# Patient Record
Sex: FEMALE | Race: WHITE | Employment: FULL TIME | URBAN - METROPOLITAN AREA
[De-identification: names, ages, dates, MRNs, and addresses within clinical notes are randomized per-mention and may not be internally consistent; named-entity substitution may affect disease eponyms.]

---

## 2019-09-17 ENCOUNTER — OFFICE VISIT (OUTPATIENT)
Dept: OBGYN | Age: 44
End: 2019-09-17
Payer: COMMERCIAL

## 2019-09-17 ENCOUNTER — HOSPITAL ENCOUNTER (OUTPATIENT)
Age: 44
Setting detail: SPECIMEN
Discharge: HOME | End: 2019-09-17
Payer: COMMERCIAL

## 2019-09-17 VITALS
WEIGHT: 146 LBS | HEIGHT: 64 IN | BODY MASS INDEX: 24.92 KG/M2 | SYSTOLIC BLOOD PRESSURE: 118 MMHG | DIASTOLIC BLOOD PRESSURE: 74 MMHG

## 2019-09-17 DIAGNOSIS — Z01.419 WELL WOMAN EXAM WITH ROUTINE GYNECOLOGICAL EXAM: Primary | ICD-10-CM

## 2019-09-17 DIAGNOSIS — Z11.51 SCREENING FOR HPV (HUMAN PAPILLOMAVIRUS): ICD-10-CM

## 2019-09-17 DIAGNOSIS — Z01.419 WELL WOMAN EXAM WITH ROUTINE GYNECOLOGICAL EXAM: ICD-10-CM

## 2019-09-17 DIAGNOSIS — Z12.39 SCREENING FOR BREAST CANCER: ICD-10-CM

## 2019-09-17 PROCEDURE — G0145 SCR C/V CYTO,THINLAYER,RESCR: HCPCS

## 2019-09-17 PROCEDURE — 87624 HPV HI-RISK TYP POOLED RSLT: CPT

## 2019-09-17 PROCEDURE — 99386 PREV VISIT NEW AGE 40-64: CPT | Performed by: ADVANCED PRACTICE MIDWIFE

## 2019-09-17 ASSESSMENT — PATIENT HEALTH QUESTIONNAIRE - PHQ9: DEPRESSION UNABLE TO ASSESS: PT REFUSES

## 2019-09-17 NOTE — PROGRESS NOTES
YEARLY PHYSICAL    Date of service: 2019    Heather Jack  Is a 40 y.o.   female    PT's PCP is: No primary care provider on file. : 1975                                             Subjective:       Patient's last menstrual period was 2019 (exact date). Are your menses regular: yes    OB History    Para Term  AB Living   0 0 0 0 0 0   SAB TAB Ectopic Molar Multiple Live Births   0 0 0 0 0 0        Social History     Tobacco Use   Smoking Status Never Smoker   Smokeless Tobacco Never Used        Social History     Substance and Sexual Activity   Alcohol Use Yes    Comment: social       History reviewed. No pertinent family history. Any family history of breast or ovarian cancer: No     Any family history of blood clots: No      Allergies: Patient has no known allergies. No current outpatient medications on file. Social History     Substance and Sexual Activity   Sexual Activity Yes    Partners: Male    Comment:  had a vasecotmy        Any bleeding or pain with intercourse: No    Last Yearly:  2016    Last pap: 2016    Last HPV: never    Last Mammogram: 2018    Last Dexascan n/a    Last colorectal screen- type:n/a*  date      Do you do self breast exams: No    History reviewed. No pertinent past medical history. Past Surgical History:   Procedure Laterality Date    FOOT SURGERY Bilateral        History reviewed. No pertinent family history. Chief Complaint   Patient presents with    Gynecologic Exam     New Patient. Yearly-PAP. Pt states her last pap was done in [facility] in . pt states no issues or concerns. pt states she has never had an abnormal pap           PE:  Vital Signs  Blood pressure 118/74, height 5' 4\" (1.626 m), weight 146 lb (66.2 kg), last menstrual period 2019. Labs:    No results found for this visit on 19.       NURSE: ORLY    HPI: pt here today

## 2019-09-19 LAB
HPV SAMPLE: NORMAL
HPV, GENOTYPE 16: NOT DETECTED
HPV, GENOTYPE 18: NOT DETECTED
HPV, HIGH RISK OTHER: NOT DETECTED
HPV, INTERPRETATION: NORMAL
SPECIMEN DESCRIPTION: NORMAL

## 2019-09-25 LAB — CYTOLOGY REPORT: NORMAL

## 2021-03-02 ENCOUNTER — OFFICE VISIT (OUTPATIENT)
Dept: FAMILY MEDICINE CLINIC | Age: 46
End: 2021-03-02
Payer: COMMERCIAL

## 2021-03-02 ENCOUNTER — HOSPITAL ENCOUNTER (OUTPATIENT)
Age: 46
Discharge: HOME | End: 2021-03-02
Payer: COMMERCIAL

## 2021-03-02 VITALS
HEIGHT: 64 IN | WEIGHT: 148 LBS | HEART RATE: 76 BPM | BODY MASS INDEX: 25.27 KG/M2 | SYSTOLIC BLOOD PRESSURE: 118 MMHG | DIASTOLIC BLOOD PRESSURE: 80 MMHG | OXYGEN SATURATION: 99 %

## 2021-03-02 DIAGNOSIS — Z00.00 WELLNESS EXAMINATION: ICD-10-CM

## 2021-03-02 DIAGNOSIS — Z12.31 SCREENING MAMMOGRAM, ENCOUNTER FOR: ICD-10-CM

## 2021-03-02 DIAGNOSIS — Z13.220 LIPID SCREENING: ICD-10-CM

## 2021-03-02 DIAGNOSIS — R10.13 EPIGASTRIC PAIN: ICD-10-CM

## 2021-03-02 DIAGNOSIS — Z00.00 WELLNESS EXAMINATION: Primary | ICD-10-CM

## 2021-03-02 DIAGNOSIS — Z13.1 DIABETES MELLITUS SCREENING: ICD-10-CM

## 2021-03-02 PROBLEM — R92.8 ABNORMAL MAMMOGRAM: Status: ACTIVE | Noted: 2018-06-25

## 2021-03-02 PROBLEM — Z01.419 WELL WOMAN EXAM WITH ROUTINE GYNECOLOGICAL EXAM: Status: ACTIVE | Noted: 2018-06-18

## 2021-03-02 PROBLEM — R92.2 DENSE BREAST TISSUE: Status: ACTIVE | Noted: 2018-06-25

## 2021-03-02 PROBLEM — R92.30 DENSE BREAST TISSUE: Status: ACTIVE | Noted: 2018-06-25

## 2021-03-02 LAB
ALBUMIN SERPL-MCNC: 4.6 G/DL (ref 3.5–5.2)
ALBUMIN/GLOBULIN RATIO: 1.5 (ref 1–2.5)
ALP BLD-CCNC: 50 U/L (ref 35–104)
ALT SERPL-CCNC: 8 U/L (ref 5–33)
AMYLASE: 24 U/L (ref 28–100)
ANION GAP SERPL CALCULATED.3IONS-SCNC: 7 MMOL/L (ref 9–17)
AST SERPL-CCNC: 13 U/L
BILIRUB SERPL-MCNC: 0.52 MG/DL (ref 0.3–1.2)
BUN BLDV-MCNC: 12 MG/DL (ref 6–20)
BUN/CREAT BLD: 15 (ref 9–20)
CALCIUM SERPL-MCNC: 9.5 MG/DL (ref 8.6–10.4)
CHLORIDE BLD-SCNC: 98 MMOL/L (ref 98–107)
CHOLESTEROL/HDL RATIO: 2.1
CHOLESTEROL: 192 MG/DL
CO2: 28 MMOL/L (ref 20–31)
CREAT SERPL-MCNC: 0.78 MG/DL (ref 0.5–0.9)
ESTIMATED AVERAGE GLUCOSE: 97 MG/DL
GFR AFRICAN AMERICAN: >60 ML/MIN
GFR NON-AFRICAN AMERICAN: >60 ML/MIN
GFR SERPL CREATININE-BSD FRML MDRD: ABNORMAL ML/MIN/{1.73_M2}
GFR SERPL CREATININE-BSD FRML MDRD: ABNORMAL ML/MIN/{1.73_M2}
GLUCOSE BLD-MCNC: 91 MG/DL (ref 70–99)
HBA1C MFR BLD: 5 % (ref 4–6)
HCT VFR BLD CALC: 43.9 % (ref 36.3–47.1)
HDLC SERPL-MCNC: 90 MG/DL
HEMOGLOBIN: 14.4 G/DL (ref 11.9–15.1)
LDL CHOLESTEROL: 87 MG/DL (ref 0–130)
LIPASE: 19 U/L (ref 13–60)
MCH RBC QN AUTO: 31.8 PG (ref 25.2–33.5)
MCHC RBC AUTO-ENTMCNC: 32.8 G/DL (ref 28.4–34.8)
MCV RBC AUTO: 96.9 FL (ref 82.6–102.9)
NRBC AUTOMATED: 0 PER 100 WBC
PDW BLD-RTO: 11.8 % (ref 11.8–14.4)
PLATELET # BLD: 224 K/UL (ref 138–453)
PMV BLD AUTO: 10.7 FL (ref 8.1–13.5)
POTASSIUM SERPL-SCNC: 3.8 MMOL/L (ref 3.7–5.3)
RBC # BLD: 4.53 M/UL (ref 3.95–5.11)
SODIUM BLD-SCNC: 133 MMOL/L (ref 135–144)
TOTAL PROTEIN: 7.6 G/DL (ref 6.4–8.3)
TRIGL SERPL-MCNC: 73 MG/DL
VLDLC SERPL CALC-MCNC: NORMAL MG/DL (ref 1–30)
WBC # BLD: 5.7 K/UL (ref 3.5–11.3)

## 2021-03-02 PROCEDURE — 99386 PREV VISIT NEW AGE 40-64: CPT | Performed by: NURSE PRACTITIONER

## 2021-03-02 PROCEDURE — 83036 HEMOGLOBIN GLYCOSYLATED A1C: CPT

## 2021-03-02 PROCEDURE — 93005 ELECTROCARDIOGRAM TRACING: CPT | Performed by: NURSE PRACTITIONER

## 2021-03-02 PROCEDURE — 36415 COLL VENOUS BLD VENIPUNCTURE: CPT

## 2021-03-02 PROCEDURE — 83690 ASSAY OF LIPASE: CPT

## 2021-03-02 PROCEDURE — 85027 COMPLETE CBC AUTOMATED: CPT

## 2021-03-02 PROCEDURE — 80061 LIPID PANEL: CPT

## 2021-03-02 PROCEDURE — 80053 COMPREHEN METABOLIC PANEL: CPT

## 2021-03-02 PROCEDURE — 82150 ASSAY OF AMYLASE: CPT

## 2021-03-02 PROCEDURE — 93010 ELECTROCARDIOGRAM REPORT: CPT | Performed by: NURSE PRACTITIONER

## 2021-03-02 RX ORDER — FAMOTIDINE 20 MG/1
20 TABLET, FILM COATED ORAL 2 TIMES DAILY
Qty: 60 TABLET | Refills: 3 | Status: SHIPPED | OUTPATIENT
Start: 2021-03-02

## 2021-03-02 SDOH — ECONOMIC STABILITY: FOOD INSECURITY: WITHIN THE PAST 12 MONTHS, THE FOOD YOU BOUGHT JUST DIDN'T LAST AND YOU DIDN'T HAVE MONEY TO GET MORE.: NEVER TRUE

## 2021-03-02 SDOH — ECONOMIC STABILITY: FOOD INSECURITY: WITHIN THE PAST 12 MONTHS, YOU WORRIED THAT YOUR FOOD WOULD RUN OUT BEFORE YOU GOT MONEY TO BUY MORE.: NEVER TRUE

## 2021-03-02 ASSESSMENT — ENCOUNTER SYMPTOMS
ABDOMINAL PAIN: 1
RHINORRHEA: 0
CONSTIPATION: 0
NAUSEA: 1
VOMITING: 1
COUGH: 0
SINUS PAIN: 0
WHEEZING: 0
ANAL BLEEDING: 0
DIARRHEA: 0
BLOOD IN STOOL: 0
SORE THROAT: 0
ABDOMINAL DISTENTION: 0
SHORTNESS OF BREATH: 0
SINUS PRESSURE: 0
CHEST TIGHTNESS: 0

## 2021-03-02 ASSESSMENT — PATIENT HEALTH QUESTIONNAIRE - PHQ9
SUM OF ALL RESPONSES TO PHQ QUESTIONS 1-9: 0
2. FEELING DOWN, DEPRESSED OR HOPELESS: 0
SUM OF ALL RESPONSES TO PHQ9 QUESTIONS 1 & 2: 0

## 2021-03-02 NOTE — PATIENT INSTRUCTIONS
SURVEY:    You may be receiving a survey from [employer] regarding your visit today. Please complete the survey to enable us to provide the highest quality of care to you and your family. If you cannot score us a very good on any question, please call the office to discuss how we could of made your experience a very good one. Thank you.

## 2021-03-02 NOTE — PROGRESS NOTES
Name: Kenyetta Cordero  : 1975         Chief Complaint:     Chief Complaint   Patient presents with   [address]     Patient comes in for est care.  Pain     Patient states she is having pain upper sternum. occurs after eating, constant, heavy. Started about a week ago. Heat and rest helps. History of Present Illness:      Kenyetta Cordero is a 39 y.o.  female who presents with Establish Care (Patient comes in for est care. ) and Pain (Patient states she is having pain upper sternum. occurs after eating, constant, heavy. Started about a week ago. Heat and rest helps.)      HPI   The patient presents to establish care. She denies known medical history or current medication use. She admits a chronic history of epigastric pain that would occur once weekly. Starting a week ago, epigastric pain has become more constant and more severe. She states that she is unable to get comfortable. Symptoms now occurring daily. She has used antacids and ibuprofen without relief. She admits nausea with one episode of vomiting 7 days ago. She denies house contacts with similar symptoms. Heating blanket to epigastric area has helped symptoms. Epigastric pain more severe after eating. She denies any triggering foods. She has eliminated coffee and alcohol with no relief of symptoms. She states her diet is not well-balanced and \"not healthy\". She admits eating a heavy diet full of meat and potatoes. She admits normal consistency bowel movement once daily. Denies diarrhea, constipation, current nausea, fever, or chills. She denies reflux symptoms expanding up her esophagus. Denies choking or difficulty swallowing. For exercise, she uses the elliptical for 30 minutes. Past Medical History:     No past medical history on file.    Reviewed all health maintenance requirements and ordered appropriate tests  Health Maintenance Due   Topic Date Due    Lipid screen  Never done    Diabetes screen  Never done Past Surgical History:     Past Surgical History:   Procedure Laterality Date    FOOT SURGERY Bilateral         Medications:       Prior to Admission medications    Medication Sig Start Date End Date Taking? Authorizing Provider   famotidine (PEPCID) 20 MG tablet Take 1 tablet by mouth 2 times daily 3/2/21  Yes GONZALO Ackerman CNP        Allergies:       No known allergies    Social History:     Tobacco:    reports that she has never smoked. She has never used smokeless tobacco.  Alcohol:      reports current alcohol use. Drug Use:  reports no history of drug use. Family History:     No family history on file. Review of Systems:     Positive and Negative as described in HPI    Review of Systems   Constitutional: Negative for chills, fatigue, fever and unexpected weight change. HENT: Negative for congestion, rhinorrhea, sinus pressure, sinus pain and sore throat. Eyes: Negative for visual disturbance. Respiratory: Negative for cough, chest tightness, shortness of breath and wheezing. Cardiovascular: Negative for chest pain and palpitations. Gastrointestinal: Positive for abdominal pain, nausea and vomiting. Negative for abdominal distention, anal bleeding, blood in stool, constipation and diarrhea. Genitourinary: Negative for difficulty urinating and dysuria. Musculoskeletal: Negative for arthralgias, joint swelling and myalgias. Skin: Negative for rash. Neurological: Negative for dizziness, light-headedness and headaches. Physical Exam:   Vitals:  /80   Pulse 76   Ht 5' 4\" (1.626 m)   Wt 148 lb (67.1 kg)   SpO2 99%   BMI 25.40 kg/m²     Physical Exam  Constitutional:       General: She is not in acute distress. Appearance: Normal appearance. She is normal weight. She is not ill-appearing or toxic-appearing. HENT:      Head: Normocephalic. Right Ear: Tympanic membrane, ear canal and external ear normal. There is no impacted cerumen. Left Ear: Tympanic membrane, ear canal and external ear normal. There is no impacted cerumen. Nose: Nose normal. No congestion or rhinorrhea. Mouth/Throat:      Mouth: Mucous membranes are moist.      Pharynx: No oropharyngeal exudate or posterior oropharyngeal erythema. Eyes:      General:         Right eye: No discharge. Left eye: No discharge. Extraocular Movements: Extraocular movements intact. Conjunctiva/sclera: Conjunctivae normal.      Pupils: Pupils are equal, round, and reactive to light. Neck:      Musculoskeletal: Neck supple. Cardiovascular:      Rate and Rhythm: Normal rate and regular rhythm. Heart sounds: Normal heart sounds. No murmur. Pulmonary:      Effort: Pulmonary effort is normal. No respiratory distress. Breath sounds: Normal breath sounds. No stridor. No wheezing, rhonchi or rales. Abdominal:      General: Abdomen is flat. Bowel sounds are normal. There is no distension. Palpations: Abdomen is soft. There is no mass. Tenderness: There is abdominal tenderness (Epigastric area). There is no guarding or rebound. Hernia: No hernia is present. Comments: Negative Renae's sign   Lymphadenopathy:      Cervical: No cervical adenopathy. Skin:     General: Skin is warm. Neurological:      Mental Status: She is alert and oriented to person, place, and time. Psychiatric:         Mood and Affect: Mood normal.         Behavior: Behavior normal.         Thought Content: Thought content normal.         Judgment: Judgment normal.         Data:     No results found for: NA, K, CL, CO2, BUN, CREATININE, GLUCOSE, PROT, LABALBU, BILITOT, ALKPHOS, AST, ALT  No results found for: WBC, RBC, HGB, HCT, MCV, MCH, MCHC, RDW, PLT, MPV  No results found for: TSH  No results found for: CHOL, HDL, PSA, LABA1C    Assessment/Plan:      Diagnosis Orders   1.  Wellness examination  CBC    Hemoglobin A1C    Lipid Panel    Comprehensive Metabolic Panel Lipase    Amylase   2. Epigastric pain  CBC    Comprehensive Metabolic Panel    Lipase    Amylase    EKG 12 lead   3. Lipid screening  Lipid Panel   4. Diabetes mellitus screening  Hemoglobin A1C   5. Screening mammogram, encounter for  RADHA DIGITAL SCREEN W OR WO CAD BILATERAL     Wellness:   Patient following with Audra Favre for [facility]. Most recent Pap 9/2019 WNL. Screening mammogram ordered today  Declines flu vaccination today  Supplied health counseling on well-balanced diet and routine physical exercise  Discussed BMI of 25  Baseline labs ordered today    Epigastric Pain:   EKG in office WNL. Discussed results with patient. Amylase and lipase ordered for completion today  Physical exam not suggestive of gallbladder involvement. However, we will continue to monitor and order ultrasound as needed. Symptoms most suggestive of GERD. Encouraged patient to trial Pepcid 20mg PRN. She may increase this to twice daily if needed.   Discussed lifestyle modifications to manage GERD symptoms including smaller, more frequent meals and avoiding lying down 3 hours after meals  Follow-up 2 weeks for reevaluation or sooner if symptoms worsen or persist.    Completed Refills   Requested Prescriptions     Signed Prescriptions Disp Refills    famotidine (PEPCID) 20 MG tablet 60 tablet 3     Sig: Take 1 tablet by mouth 2 times daily       Orders Placed This Encounter   Procedures    RADHA DIGITAL SCREEN W OR WO CAD BILATERAL     Standing Status:   Future     Standing Expiration Date:   3/2/2022     Order Specific Question:   Reason for exam:     Answer:   screening mammogram    CBC     Standing Status:   Future     Standing Expiration Date:   3/2/2022    Hemoglobin A1C     Standing Status:   Future     Standing Expiration Date:   3/2/2022    Lipid Panel     Standing Status:   Future     Standing Expiration Date:   3/2/2022     Order Specific Question:   Is Patient Fasting?/# of Hours Answer:   fasting    Comprehensive Metabolic Panel     Standing Status:   Future     Standing Expiration Date:   3/2/2022    Lipase     Standing Status:   Future     Standing Expiration Date:   3/2/2022    Amylase     Standing Status:   Future     Standing Expiration Date:   3/2/2022    EKG 12 lead     Order Specific Question:   Reason for Exam?     Answer: Other     Comments:   epigastric pain        No results found for this visit on 03/02/21. Return in about 2 weeks (around 3/16/2021), or if symptoms worsen or fail to improve, for abdominal pain f/u .     Electronically signed by GONZALO Justice CNP on 03/02/21 at 9:38 AM.

## 2021-03-16 ENCOUNTER — OFFICE VISIT (OUTPATIENT)
Dept: FAMILY MEDICINE CLINIC | Age: 46
End: 2021-03-16
Payer: COMMERCIAL

## 2021-03-16 VITALS
OXYGEN SATURATION: 99 % | BODY MASS INDEX: 25.27 KG/M2 | WEIGHT: 148 LBS | DIASTOLIC BLOOD PRESSURE: 80 MMHG | SYSTOLIC BLOOD PRESSURE: 116 MMHG | HEART RATE: 76 BPM | HEIGHT: 64 IN

## 2021-03-16 DIAGNOSIS — K21.9 GASTROESOPHAGEAL REFLUX DISEASE WITHOUT ESOPHAGITIS: Primary | ICD-10-CM

## 2021-03-16 PROCEDURE — 99213 OFFICE O/P EST LOW 20 MIN: CPT | Performed by: NURSE PRACTITIONER

## 2021-03-16 ASSESSMENT — ENCOUNTER SYMPTOMS: ABDOMINAL PAIN: 0

## 2021-03-16 NOTE — PROGRESS NOTES
Name: Gena Aguiar  : 1975         Chief Complaint:     Chief Complaint   Patient presents with    Follow-up     Patient states the first week was rough, but this week has improved alot. History of Present Illness:      Gena Aguiar is a 39 y.o.  female who presents with Follow-up (Patient states the first week was rough, but this week has improved alot. )      HPI    Epigastric Pain:   The patient presents for 2 week follow up of epigastric pain. She was initiated on pepcid 20mg BID two weeks ago. At first, she admits to worsening nausea and vomiting prior to beginning the Pepcid. She began to take the Pepcid once daily as needed but states that this was not successful in relieving her symptoms. There was an episode where she had 12 hours of severe epigastric pain accompanied with vomiting. She then switched to taking Pepcid twice daily and states that her symptoms have improved since then. Triggering foods include red meat and tomato sauce. Since taking the Pepcid, she admits dull, \"tight\" epigastric pain only after eating triggering foods. If she avoids these foods, she does not have any symptoms. She denies hematemesis, difficulty swallowing, or weight loss. She admits remaining upright after meals and having an increasing appetite. Overall, the patient's symptoms have improved. Past Medical History:     No past medical history on file. Reviewed all health maintenance requirements and ordered appropriate tests  There are no preventive care reminders to display for this patient. Past Surgical History:     Past Surgical History:   Procedure Laterality Date    FOOT SURGERY Bilateral         Medications:       Prior to Admission medications    Medication Sig Start Date End Date Taking?  Authorizing Provider   famotidine (PEPCID) 20 MG tablet Take 1 tablet by mouth 2 times daily 3/2/21  Yes GONZALO Auguste CNP        Allergies:       No known allergies    Social History: Tobacco:    reports that she has never smoked. She has never used smokeless tobacco.  Alcohol:      reports current alcohol use. Drug Use:  reports no history of drug use. Family History:     No family history on file. Review of Systems:     Positive and Negative as described in HPI    Review of Systems   Gastrointestinal: Negative for abdominal pain. No dysphagia       Physical Exam:   Vitals:  /80   Pulse 76   Ht 5' 4\" (1.626 m)   Wt 148 lb (67.1 kg)   SpO2 99%   BMI 25.40 kg/m²     Physical Exam  Constitutional:       General: She is not in acute distress. Appearance: Normal appearance. She is normal weight. She is not ill-appearing or toxic-appearing. HENT:      Head: Normocephalic. Cardiovascular:      Rate and Rhythm: Normal rate and regular rhythm. Heart sounds: Normal heart sounds. No murmur. Pulmonary:      Effort: Pulmonary effort is normal. No respiratory distress. Breath sounds: Normal breath sounds. No stridor. No wheezing, rhonchi or rales. Abdominal:      General: Abdomen is flat. Bowel sounds are normal. There is no distension. Palpations: Abdomen is soft. There is no mass. Tenderness: There is no abdominal tenderness. There is no guarding. Hernia: No hernia is present. Comments: No epigastric tenderness with palpation   Skin:     General: Skin is warm. Neurological:      Mental Status: She is alert and oriented to person, place, and time. Psychiatric:         Mood and Affect: Mood normal.         Behavior: Behavior normal.         Thought Content:  Thought content normal.         Judgment: Judgment normal.         Data:     Lab Results   Component Value Date     03/02/2021    K 3.8 03/02/2021    CL 98 03/02/2021    CO2 28 03/02/2021    BUN 12 03/02/2021    CREATININE 0.78 03/02/2021    GLUCOSE 91 03/02/2021    PROT 7.6 03/02/2021    LABALBU 4.6 03/02/2021    BILITOT 0.52 03/02/2021    ALKPHOS 50 03/02/2021    AST 13 03/02/2021    ALT 8 03/02/2021     Lab Results   Component Value Date    WBC 5.7 03/02/2021    RBC 4.53 03/02/2021    HGB 14.4 03/02/2021    HCT 43.9 03/02/2021    MCV 96.9 03/02/2021    MCH 31.8 03/02/2021    MCHC 32.8 03/02/2021    RDW 11.8 03/02/2021     03/02/2021    MPV 10.7 03/02/2021     No results found for: TSH  Lab Results   Component Value Date    CHOL 192 03/02/2021    HDL 90 03/02/2021    LABA1C 5.0 03/02/2021       Assessment/Plan:      Diagnosis Orders   1. Gastroesophageal reflux disease without esophagitis         Symptoms stable on Pepcid twice daily. Continue this regimen. Encouraged continuation of dietary diary in hopes of identifying and eliminating triggering foods. Reviewed GERD lifestyle modifications including small frequent meals and staying upright for 3 hours after meals. Discussed if symptoms worsened or persist, we may trial PPI. However, because she has improved so greatly on Pepcid we will continue this. Follow-up in 4 months or sooner if questions worsen or persist.    Completed Refills   Requested Prescriptions      No prescriptions requested or ordered in this encounter       No orders of the defined types were placed in this encounter. No results found for this visit on 03/16/21. Return in about 4 months (around 7/16/2021), or if symptoms worsen or fail to improve, for GERD f/u.     Electronically signed by GONZALO Reina CNP on 03/16/21 at 1:56 PM.

## 2021-04-01 PROBLEM — Z01.419 WELL WOMAN EXAM WITH ROUTINE GYNECOLOGICAL EXAM: Status: RESOLVED | Noted: 2018-06-18 | Resolved: 2021-04-01

## 2021-06-04 ENCOUNTER — HOSPITAL ENCOUNTER (OUTPATIENT)
Dept: WOMENS IMAGING | Age: 46
Discharge: HOME | End: 2021-06-06
Payer: COMMERCIAL

## 2021-06-04 DIAGNOSIS — Z12.31 SCREENING MAMMOGRAM, ENCOUNTER FOR: ICD-10-CM

## 2021-06-04 PROCEDURE — 77063 BREAST TOMOSYNTHESIS BI: CPT

## 2022-04-22 ENCOUNTER — TELEPHONE (OUTPATIENT)
Dept: FAMILY MEDICINE CLINIC | Age: 47
End: 2022-04-22

## 2022-04-22 ASSESSMENT — PATIENT HEALTH QUESTIONNAIRE - PHQ9
2. FEELING DOWN, DEPRESSED OR HOPELESS: 0
SUM OF ALL RESPONSES TO PHQ QUESTIONS 1-9: 0
SUM OF ALL RESPONSES TO PHQ9 QUESTIONS 1 & 2: 0
1. LITTLE INTEREST OR PLEASURE IN DOING THINGS: 0
SUM OF ALL RESPONSES TO PHQ QUESTIONS 1-9: 0

## 2022-10-05 ENCOUNTER — OFFICE VISIT (OUTPATIENT)
Dept: PRIMARY CARE CLINIC | Age: 47
End: 2022-10-05
Payer: COMMERCIAL

## 2022-10-05 VITALS
HEIGHT: 64 IN | HEART RATE: 76 BPM | OXYGEN SATURATION: 97 % | TEMPERATURE: 97.7 F | DIASTOLIC BLOOD PRESSURE: 60 MMHG | BODY MASS INDEX: 24.59 KG/M2 | WEIGHT: 144 LBS | SYSTOLIC BLOOD PRESSURE: 100 MMHG

## 2022-10-05 DIAGNOSIS — Z00.00 WELLNESS EXAMINATION: Primary | ICD-10-CM

## 2022-10-05 PROCEDURE — 99396 PREV VISIT EST AGE 40-64: CPT | Performed by: NURSE PRACTITIONER

## 2022-10-05 PROCEDURE — 99211 OFF/OP EST MAY X REQ PHY/QHP: CPT | Performed by: NURSE PRACTITIONER

## 2022-10-05 SDOH — ECONOMIC STABILITY: FOOD INSECURITY: WITHIN THE PAST 12 MONTHS, YOU WORRIED THAT YOUR FOOD WOULD RUN OUT BEFORE YOU GOT MONEY TO BUY MORE.: NEVER TRUE

## 2022-10-05 SDOH — ECONOMIC STABILITY: FOOD INSECURITY: WITHIN THE PAST 12 MONTHS, THE FOOD YOU BOUGHT JUST DIDN'T LAST AND YOU DIDN'T HAVE MONEY TO GET MORE.: NEVER TRUE

## 2022-10-05 ASSESSMENT — ENCOUNTER SYMPTOMS
RHINORRHEA: 0
SORE THROAT: 0
DIARRHEA: 0
SINUS PAIN: 0
SHORTNESS OF BREATH: 0
WHEEZING: 0
ABDOMINAL PAIN: 0
COUGH: 0
CONSTIPATION: 0
SINUS PRESSURE: 0

## 2022-10-05 ASSESSMENT — PATIENT HEALTH QUESTIONNAIRE - PHQ9
SUM OF ALL RESPONSES TO PHQ QUESTIONS 1-9: 0
2. FEELING DOWN, DEPRESSED OR HOPELESS: 0
1. LITTLE INTEREST OR PLEASURE IN DOING THINGS: 0
SUM OF ALL RESPONSES TO PHQ9 QUESTIONS 1 & 2: 0

## 2022-10-05 ASSESSMENT — SOCIAL DETERMINANTS OF HEALTH (SDOH): HOW HARD IS IT FOR YOU TO PAY FOR THE VERY BASICS LIKE FOOD, HOUSING, MEDICAL CARE, AND HEATING?: NOT HARD AT ALL

## 2022-10-05 NOTE — PROGRESS NOTES
Name: Summer Boland  : 1975         Chief Complaint:     Chief Complaint   Patient presents with    Annual Exam     Pt here for wellness exam, no complaints at this time       History of Present Illness:      Summer Boland is a 52 y.o.  female who presents with Annual Exam (Pt here for wellness exam, no complaints at this time)      HPI    The patient presents for wellness exam.  She denies concerns today. She admits well-balanced diet. For exercise she notes 30 minutes 5 days per week, including ellipctal.  She is vaccinated for COVID. Most recent tetanus vaccine unknown. She is planning to receive her flu vaccine through her employer this year. Most recent colorectal cancer screening never. Most recent mammogram 2021 benign. Most recent Pap smear 2019 benign. Following with Melodie Alexis CNM for women's health. She denies family history of breast cancer and colorectal cancer. She denies routine eye exams. She denies routine dental exams. Past Medical History:     No past medical history on file. Reviewed all health maintenance requirements and ordered appropriate tests  Health Maintenance Due   Topic Date Due    HIV screen  Never done    Hepatitis C screen  Never done    DTaP/Tdap/Td vaccine (1 - Tdap) Never done    Colorectal Cancer Screen  Never done    Flu vaccine (1) Never done       Past Surgical History:     Past Surgical History:   Procedure Laterality Date    FOOT SURGERY Bilateral         Medications:       Prior to Admission medications    Medication Sig Start Date End Date Taking? Authorizing Provider   famotidine (PEPCID) 20 MG tablet Take 1 tablet by mouth 2 times daily  Patient not taking: Reported on 10/5/2022 3/2/21   Payal Collins, APRN - CNP        Allergies:       No known allergies    Social History:     Tobacco:    reports that she has never smoked.  She has never used smokeless tobacco.  Alcohol:      reports current alcohol use of about 2.0 standard drinks per week.  Drug Use:  reports no history of drug use. Family History:     No family history on file. Review of Systems:     Positive and Negative as described in HPI    Review of Systems   Constitutional:  Negative for chills, fatigue, fever and unexpected weight change. HENT:  Negative for congestion, rhinorrhea, sinus pressure, sinus pain and sore throat. Eyes:  Negative for visual disturbance. Respiratory:  Negative for cough, shortness of breath and wheezing. Cardiovascular:  Negative for chest pain and palpitations. Gastrointestinal:  Negative for abdominal pain, constipation and diarrhea. Genitourinary:  Negative for difficulty urinating. Skin:  Negative for rash. Neurological:  Negative for dizziness, light-headedness and headaches. Physical Exam:   Vitals:  /60   Pulse 76   Temp 97.7 °F (36.5 °C)   Ht 5' 4\" (1.626 m)   Wt 144 lb (65.3 kg)   SpO2 97%   BMI 24.72 kg/m²     Physical Exam  Constitutional:       General: She is not in acute distress. Appearance: Normal appearance. She is normal weight. She is not ill-appearing or toxic-appearing. HENT:      Right Ear: Tympanic membrane, ear canal and external ear normal. There is no impacted cerumen. Left Ear: Tympanic membrane, ear canal and external ear normal. There is no impacted cerumen. Nose: Nose normal. No congestion or rhinorrhea. Mouth/Throat:      Mouth: Mucous membranes are moist.      Pharynx: No oropharyngeal exudate or posterior oropharyngeal erythema. Cardiovascular:      Rate and Rhythm: Normal rate and regular rhythm. Heart sounds: Normal heart sounds. No murmur heard. Pulmonary:      Effort: Pulmonary effort is normal. No respiratory distress. Breath sounds: Normal breath sounds. No stridor. No wheezing, rhonchi or rales. Abdominal:      General: Abdomen is flat. Bowel sounds are normal. There is no distension. Palpations: Abdomen is soft. There is no mass. Tenderness: There is no abdominal tenderness. There is no guarding. Hernia: No hernia is present. Musculoskeletal:      Cervical back: Neck supple. Lymphadenopathy:      Cervical: No cervical adenopathy. Skin:     General: Skin is warm. Neurological:      Mental Status: She is alert and oriented to person, place, and time. Psychiatric:         Mood and Affect: Mood normal.         Behavior: Behavior normal.         Thought Content: Thought content normal.         Judgment: Judgment normal.       Data:     Lab Results   Component Value Date/Time     03/02/2021 09:41 AM    K 3.8 03/02/2021 09:41 AM    CL 98 03/02/2021 09:41 AM    CO2 28 03/02/2021 09:41 AM    BUN 12 03/02/2021 09:41 AM    CREATININE 0.78 03/02/2021 09:41 AM    GLUCOSE 91 03/02/2021 09:41 AM    PROT 7.6 03/02/2021 09:41 AM    LABALBU 4.6 03/02/2021 09:41 AM    BILITOT 0.52 03/02/2021 09:41 AM    ALKPHOS 50 03/02/2021 09:41 AM    AST 13 03/02/2021 09:41 AM    ALT 8 03/02/2021 09:41 AM     Lab Results   Component Value Date/Time    WBC 5.7 03/02/2021 09:41 AM    RBC 4.53 03/02/2021 09:41 AM    HGB 14.4 03/02/2021 09:41 AM    HCT 43.9 03/02/2021 09:41 AM    MCV 96.9 03/02/2021 09:41 AM    MCH 31.8 03/02/2021 09:41 AM    MCHC 32.8 03/02/2021 09:41 AM    RDW 11.8 03/02/2021 09:41 AM     03/02/2021 09:41 AM    MPV 10.7 03/02/2021 09:41 AM     No results found for: TSH  Lab Results   Component Value Date/Time    CHOL 192 03/02/2021 09:41 AM    HDL 90 03/02/2021 09:41 AM    LABA1C 5.0 03/02/2021 09:41 AM       Assessment/Plan:      Diagnosis Orders   1.  Wellness examination          Wellness visit:   - Counseled on well balanced diet and routine physical activity   - Encouraged routine eye and dental exams   - Encouraged tetanus vaccine, patient declines   - Pt to complete flu vaccine with employer   - UTD covid vaccine   - UTD mammogram   - UTD pap smear   - Counseled at length regarding cologuard versus colonoscopy including benefits of each. She declines both options   - Wellness labs ordered today for completion. Reviewed 2021 labs including WNL A1c and lipid panel     Completed Refills   Requested Prescriptions      No prescriptions requested or ordered in this encounter       No orders of the defined types were placed in this encounter. No results found for this visit on 10/05/22. No follow-ups on file.     Electronically signed by GONZALO Morrison CNP on 10/05/22 at 4:32 PM.

## 2022-10-05 NOTE — PATIENT INSTRUCTIONS
SURVEY:    You may be receiving a survey from [employer] regarding your visit today. Please complete the survey to enable us to provide the highest quality of care to you and your family. If you cannot score us a very good on any question, please call the office to discuss how we could of made your experience a very good one. Thank you.

## 2023-08-08 ENCOUNTER — HOSPITAL ENCOUNTER (OUTPATIENT)
Dept: WOMENS IMAGING | Age: 48
Discharge: HOME | End: 2023-08-10
Payer: COMMERCIAL

## 2023-08-08 DIAGNOSIS — Z00.00 WELLNESS EXAMINATION: ICD-10-CM

## 2023-08-08 DIAGNOSIS — Z12.31 BREAST CANCER SCREENING BY MAMMOGRAM: ICD-10-CM

## 2023-08-08 PROCEDURE — 77067 SCR MAMMO BI INCL CAD: CPT

## 2023-11-29 ENCOUNTER — OFFICE VISIT (OUTPATIENT)
Dept: PRIMARY CARE CLINIC | Age: 48
End: 2023-11-29
Payer: COMMERCIAL

## 2023-11-29 VITALS
OXYGEN SATURATION: 99 % | HEIGHT: 64 IN | DIASTOLIC BLOOD PRESSURE: 70 MMHG | TEMPERATURE: 98.2 F | WEIGHT: 140 LBS | HEART RATE: 77 BPM | SYSTOLIC BLOOD PRESSURE: 110 MMHG | BODY MASS INDEX: 23.9 KG/M2

## 2023-11-29 DIAGNOSIS — Z11.59 NEED FOR HEPATITIS C SCREENING TEST: ICD-10-CM

## 2023-11-29 DIAGNOSIS — Z00.00 WELLNESS EXAMINATION: Primary | ICD-10-CM

## 2023-11-29 DIAGNOSIS — Z11.4 ENCOUNTER FOR SCREENING FOR HIV: ICD-10-CM

## 2023-11-29 DIAGNOSIS — Z13.29 THYROID DISORDER SCREEN: ICD-10-CM

## 2023-11-29 PROCEDURE — 99396 PREV VISIT EST AGE 40-64: CPT | Performed by: NURSE PRACTITIONER

## 2023-11-29 SDOH — ECONOMIC STABILITY: INCOME INSECURITY: HOW HARD IS IT FOR YOU TO PAY FOR THE VERY BASICS LIKE FOOD, HOUSING, MEDICAL CARE, AND HEATING?: NOT HARD AT ALL

## 2023-11-29 SDOH — ECONOMIC STABILITY: HOUSING INSECURITY
IN THE LAST 12 MONTHS, WAS THERE A TIME WHEN YOU DID NOT HAVE A STEADY PLACE TO SLEEP OR SLEPT IN A SHELTER (INCLUDING NOW)?: NO

## 2023-11-29 SDOH — ECONOMIC STABILITY: FOOD INSECURITY: WITHIN THE PAST 12 MONTHS, YOU WORRIED THAT YOUR FOOD WOULD RUN OUT BEFORE YOU GOT MONEY TO BUY MORE.: NEVER TRUE

## 2023-11-29 SDOH — ECONOMIC STABILITY: FOOD INSECURITY: WITHIN THE PAST 12 MONTHS, THE FOOD YOU BOUGHT JUST DIDN'T LAST AND YOU DIDN'T HAVE MONEY TO GET MORE.: NEVER TRUE

## 2023-11-29 ASSESSMENT — PATIENT HEALTH QUESTIONNAIRE - PHQ9
SUM OF ALL RESPONSES TO PHQ QUESTIONS 1-9: 0
SUM OF ALL RESPONSES TO PHQ9 QUESTIONS 1 & 2: 0
1. LITTLE INTEREST OR PLEASURE IN DOING THINGS: 0
2. FEELING DOWN, DEPRESSED OR HOPELESS: 0

## 2023-11-29 ASSESSMENT — ENCOUNTER SYMPTOMS
RHINORRHEA: 0
CONSTIPATION: 0
DIARRHEA: 0
ABDOMINAL PAIN: 0
SHORTNESS OF BREATH: 0

## 2023-11-29 NOTE — PROGRESS NOTES
Thought content normal.         Judgment: Judgment normal.         Data:     Lab Results   Component Value Date/Time     03/02/2021 09:41 AM    K 3.8 03/02/2021 09:41 AM    CL 98 03/02/2021 09:41 AM    CO2 28 03/02/2021 09:41 AM    BUN 12 03/02/2021 09:41 AM    CREATININE 0.78 03/02/2021 09:41 AM    GLUCOSE 91 03/02/2021 09:41 AM    PROT 7.6 03/02/2021 09:41 AM    LABALBU 4.6 03/02/2021 09:41 AM    BILITOT 0.52 03/02/2021 09:41 AM    ALKPHOS 50 03/02/2021 09:41 AM    AST 13 03/02/2021 09:41 AM    ALT 8 03/02/2021 09:41 AM     Lab Results   Component Value Date/Time    WBC 5.7 03/02/2021 09:41 AM    RBC 4.53 03/02/2021 09:41 AM    HGB 14.4 03/02/2021 09:41 AM    HCT 43.9 03/02/2021 09:41 AM    MCV 96.9 03/02/2021 09:41 AM    MCH 31.8 03/02/2021 09:41 AM    MCHC 32.8 03/02/2021 09:41 AM    RDW 11.8 03/02/2021 09:41 AM     03/02/2021 09:41 AM    MPV 10.7 03/02/2021 09:41 AM     No results found for: \"TSH\"  Lab Results   Component Value Date/Time    CHOL 192 03/02/2021 09:41 AM    HDL 90 03/02/2021 09:41 AM    LABA1C 5.0 03/02/2021 09:41 AM       Assessment/Plan:      Diagnosis Orders   1. Wellness examination  Lipid Panel    Comprehensive Metabolic Panel    CBC    TSH With Reflex Ft4    HIV Screen    Hepatitis C Antibody      2. Thyroid disorder screen  TSH With Reflex Ft4      3. Encounter for screening for HIV  HIV Screen      4. Need for hepatitis C screening test  Hepatitis C Antibody          Wellness:  - Counseled on well-balanced diet and routine physical activity  - Encouraged routine eye and dental exams  - Encouraged tetanus, COVID booster, flu vaccine. She declines these today as she is getting ready to go on vacation tomorrow  - UTD Pap smear, encouraged her to follow-up with her gynecologist for routine visits  - Mammogram UTD  - Lab work ordered, patient agreeable to HIV and hep C screen  - Counseled on colorectal cancer screening including colonoscopy versus Cologuard.   Patient declines

## 2024-07-03 ENCOUNTER — OFFICE VISIT (OUTPATIENT)
Dept: OBGYN | Age: 49
End: 2024-07-03
Payer: COMMERCIAL

## 2024-07-03 ENCOUNTER — HOSPITAL ENCOUNTER (OUTPATIENT)
Age: 49
Setting detail: SPECIMEN
Discharge: HOME | End: 2024-07-03
Payer: COMMERCIAL

## 2024-07-03 VITALS
HEIGHT: 64 IN | SYSTOLIC BLOOD PRESSURE: 110 MMHG | WEIGHT: 143 LBS | DIASTOLIC BLOOD PRESSURE: 62 MMHG | BODY MASS INDEX: 24.41 KG/M2

## 2024-07-03 DIAGNOSIS — Z01.419 ENCOUNTER FOR WELL WOMAN EXAM WITH ROUTINE GYNECOLOGICAL EXAM: Primary | ICD-10-CM

## 2024-07-03 DIAGNOSIS — Z12.31 ENCOUNTER FOR SCREENING MAMMOGRAM FOR MALIGNANT NEOPLASM OF BREAST: ICD-10-CM

## 2024-07-03 DIAGNOSIS — Z01.419 ENCOUNTER FOR WELL WOMAN EXAM WITH ROUTINE GYNECOLOGICAL EXAM: ICD-10-CM

## 2024-07-03 DIAGNOSIS — N63.24 MASS OF LOWER INNER QUADRANT OF LEFT BREAST: ICD-10-CM

## 2024-07-03 DIAGNOSIS — Z11.51 SCREENING FOR HPV (HUMAN PAPILLOMAVIRUS): ICD-10-CM

## 2024-07-03 PROCEDURE — 87624 HPV HI-RISK TYP POOLED RSLT: CPT

## 2024-07-03 PROCEDURE — 99396 PREV VISIT EST AGE 40-64: CPT | Performed by: ADVANCED PRACTICE MIDWIFE

## 2024-07-03 PROCEDURE — G0145 SCR C/V CYTO,THINLAYER,RESCR: HCPCS

## 2024-07-03 ASSESSMENT — PATIENT HEALTH QUESTIONNAIRE - PHQ9
2. FEELING DOWN, DEPRESSED OR HOPELESS: NOT AT ALL
SUM OF ALL RESPONSES TO PHQ9 QUESTIONS 1 & 2: 0
SUM OF ALL RESPONSES TO PHQ QUESTIONS 1-9: 0
SUM OF ALL RESPONSES TO PHQ QUESTIONS 1-9: 0
1. LITTLE INTEREST OR PLEASURE IN DOING THINGS: NOT AT ALL
SUM OF ALL RESPONSES TO PHQ QUESTIONS 1-9: 0
SUM OF ALL RESPONSES TO PHQ QUESTIONS 1-9: 0

## 2024-07-03 ASSESSMENT — ENCOUNTER SYMPTOMS
GASTROINTESTINAL NEGATIVE: 1
ALLERGIC/IMMUNOLOGIC NEGATIVE: 1
EYES NEGATIVE: 1
RESPIRATORY NEGATIVE: 1

## 2024-07-03 NOTE — PATIENT INSTRUCTIONS
SURVEY:    You may be receiving a survey regarding your visit today.    Please complete the survey to enable us to provide the highest quality of care to you and your family.    We strive for all 5's - thank you    If you cannot score us a very good (5) on any question, please call the office to discuss this with Sierra (our ). We would like to discuss how we could of made your experience a very good one.    Sierra: [phone]    Thank you.

## 2024-07-03 NOTE — PROGRESS NOTES
YEARLY PHYSICAL    Date of service: 7/3/2024    Joceline Newell  Is a 48 y.o.  , female    PT's PCP is: Tiffanie Jimenez APRN - CNP     : 1975                                             Subjective:       Patient's last menstrual period was 2024 (approximate).     Are your menses regular: no    OB History    Para Term  AB Living   0 0 0 0 0 0   SAB IAB Ectopic Molar Multiple Live Births   0 0 0 0 0 0        Social History     Tobacco Use   Smoking Status Never   Smokeless Tobacco Never        Social History     Substance and Sexual Activity   Alcohol Use Yes    Alcohol/week: 2.0 standard drinks of alcohol    Types: 2 Glasses of wine per week    Comment: social       No family history on file.    Any family history of breast or ovarian cancer: No    Any family history of blood clots: No    Allergies: No known allergies    No current outpatient medications on file.    Social History     Substance and Sexual Activity   Sexual Activity Yes    Partners: Male    Comment:  had a vasecotmy        Any bleeding or pain with intercourse: No    Last Yearly date:      Last pap date : Date of last Cervical Cancer screen (HPV or PAP): 2019     results: neg     Last HPV date and results: 19 neg     Has pt ever had an abnormal:No    IF yes result and date: n/a     Mammogram Result (most recent):  [facility] DIGITAL SCREEN SELF REFERRAL W OR WO CAD BILATERAL 2023    Narrative  EXAMINATION:  SCREENING DIGITAL BILATERAL MAMMOGRAM WITH TOMOSYNTHESIS, 2023    TECHNIQUE:  Screening mammography of the bilateral breasts was performed with  tomosynthesis.  2D standard and 3D tomosynthesis combination imaging  performed through both breasts in the MLO and CC projection.  Computer aided  detection was utilized in the interpretation of this exam.    COMPARISON:  2021, [facility].  2018, 2018

## 2024-07-06 LAB
HPV I/H RISK 4 DNA CVX QL NAA+PROBE: NOT DETECTED
HPV SAMPLE: NORMAL
HPV, INTERPRETATION: NORMAL
HPV16 DNA CVX QL NAA+PROBE: NOT DETECTED
HPV18 DNA CVX QL NAA+PROBE: NOT DETECTED
SPECIMEN DESCRIPTION: NORMAL

## 2024-07-11 LAB — CYTOLOGY REPORT: NORMAL

## 2024-07-12 ENCOUNTER — HOSPITAL ENCOUNTER (OUTPATIENT)
Dept: ULTRASOUND IMAGING | Age: 49
End: 2024-07-12
Payer: COMMERCIAL

## 2024-07-12 ENCOUNTER — HOSPITAL ENCOUNTER (OUTPATIENT)
Dept: WOMENS IMAGING | Age: 49
Discharge: HOME | End: 2024-07-12
Payer: COMMERCIAL

## 2024-07-12 VITALS — BODY MASS INDEX: 24.75 KG/M2 | WEIGHT: 145 LBS | HEIGHT: 64 IN

## 2024-07-12 DIAGNOSIS — N63.24 MASS OF LOWER INNER QUADRANT OF LEFT BREAST: ICD-10-CM

## 2024-07-12 PROCEDURE — 76642 ULTRASOUND BREAST LIMITED: CPT

## 2024-07-12 PROCEDURE — G0279 TOMOSYNTHESIS, MAMMO: HCPCS

## 2024-07-15 DIAGNOSIS — R92.322 SCATTERED FIBROGLANDULAR TISSUE DENSITY OF LEFT BREAST ON MAMMOGRAPHY: Primary | ICD-10-CM

## 2025-01-10 ENCOUNTER — APPOINTMENT (OUTPATIENT)
Dept: ULTRASOUND IMAGING | Age: 50
End: 2025-01-10
Attending: ADVANCED PRACTICE MIDWIFE
Payer: COMMERCIAL

## 2025-01-10 ENCOUNTER — HOSPITAL ENCOUNTER (OUTPATIENT)
Dept: WOMENS IMAGING | Age: 50
Discharge: HOME | End: 2025-01-12
Attending: ADVANCED PRACTICE MIDWIFE
Payer: COMMERCIAL

## 2025-01-10 VITALS — WEIGHT: 150 LBS | BODY MASS INDEX: 25.61 KG/M2 | HEIGHT: 64 IN

## 2025-01-10 DIAGNOSIS — R92.322 SCATTERED FIBROGLANDULAR TISSUE DENSITY OF LEFT BREAST ON MAMMOGRAPHY: ICD-10-CM

## 2025-01-10 PROCEDURE — G0279 TOMOSYNTHESIS, MAMMO: HCPCS

## 2025-08-25 ENCOUNTER — OFFICE VISIT (OUTPATIENT)
Dept: PRIMARY CARE CLINIC | Age: 50
End: 2025-08-25
Payer: COMMERCIAL

## 2025-08-25 ENCOUNTER — TELEPHONE (OUTPATIENT)
Dept: PRIMARY CARE CLINIC | Age: 50
End: 2025-08-25

## 2025-08-25 VITALS
HEART RATE: 77 BPM | OXYGEN SATURATION: 98 % | BODY MASS INDEX: 25.58 KG/M2 | WEIGHT: 149 LBS | DIASTOLIC BLOOD PRESSURE: 72 MMHG | TEMPERATURE: 94.7 F | SYSTOLIC BLOOD PRESSURE: 110 MMHG

## 2025-08-25 DIAGNOSIS — Z23 NEED FOR VACCINATION: ICD-10-CM

## 2025-08-25 DIAGNOSIS — Z12.31 BREAST CANCER SCREENING BY MAMMOGRAM: ICD-10-CM

## 2025-08-25 DIAGNOSIS — Z12.12 ENCOUNTER FOR COLORECTAL CANCER SCREENING: Primary | ICD-10-CM

## 2025-08-25 DIAGNOSIS — Z00.00 ANNUAL PHYSICAL EXAM: Primary | ICD-10-CM

## 2025-08-25 DIAGNOSIS — Z12.11 ENCOUNTER FOR COLORECTAL CANCER SCREENING: Primary | ICD-10-CM

## 2025-08-25 PROBLEM — R92.8 ABNORMAL MAMMOGRAM: Status: RESOLVED | Noted: 2018-06-25 | Resolved: 2025-08-25

## 2025-08-25 PROCEDURE — 90472 IMMUNIZATION ADMIN EACH ADD: CPT | Performed by: NURSE PRACTITIONER

## 2025-08-25 PROCEDURE — 90471 IMMUNIZATION ADMIN: CPT | Performed by: NURSE PRACTITIONER

## 2025-08-25 PROCEDURE — 90715 TDAP VACCINE 7 YRS/> IM: CPT | Performed by: NURSE PRACTITIONER

## 2025-08-25 PROCEDURE — 99396 PREV VISIT EST AGE 40-64: CPT | Performed by: NURSE PRACTITIONER

## 2025-08-25 SDOH — ECONOMIC STABILITY: FOOD INSECURITY: WITHIN THE PAST 12 MONTHS, YOU WORRIED THAT YOUR FOOD WOULD RUN OUT BEFORE YOU GOT MONEY TO BUY MORE.: NEVER TRUE

## 2025-08-25 SDOH — ECONOMIC STABILITY: FOOD INSECURITY: WITHIN THE PAST 12 MONTHS, THE FOOD YOU BOUGHT JUST DIDN'T LAST AND YOU DIDN'T HAVE MONEY TO GET MORE.: NEVER TRUE

## 2025-08-25 ASSESSMENT — PATIENT HEALTH QUESTIONNAIRE - PHQ9
SUM OF ALL RESPONSES TO PHQ QUESTIONS 1-9: 0
2. FEELING DOWN, DEPRESSED OR HOPELESS: NOT AT ALL
1. LITTLE INTEREST OR PLEASURE IN DOING THINGS: NOT AT ALL